# Patient Record
Sex: MALE | Race: BLACK OR AFRICAN AMERICAN | NOT HISPANIC OR LATINO | Employment: FULL TIME | URBAN - METROPOLITAN AREA
[De-identification: names, ages, dates, MRNs, and addresses within clinical notes are randomized per-mention and may not be internally consistent; named-entity substitution may affect disease eponyms.]

---

## 2023-08-10 ENCOUNTER — HOSPITAL ENCOUNTER (EMERGENCY)
Facility: HOSPITAL | Age: 53
Discharge: HOME | End: 2023-08-10
Attending: EMERGENCY MEDICINE

## 2023-08-10 VITALS
OXYGEN SATURATION: 99 % | WEIGHT: 165 LBS | SYSTOLIC BLOOD PRESSURE: 147 MMHG | HEIGHT: 67 IN | TEMPERATURE: 99 F | DIASTOLIC BLOOD PRESSURE: 90 MMHG | RESPIRATION RATE: 18 BRPM | BODY MASS INDEX: 25.9 KG/M2 | HEART RATE: 88 BPM

## 2023-08-10 DIAGNOSIS — M75.52 ACUTE BURSITIS OF LEFT SHOULDER: Primary | ICD-10-CM

## 2023-08-10 PROCEDURE — 96372 THER/PROPH/DIAG INJ SC/IM: CPT | Performed by: EMERGENCY MEDICINE

## 2023-08-10 PROCEDURE — 63600175 PHARM REV CODE 636 W HCPCS: Mod: ER | Performed by: EMERGENCY MEDICINE

## 2023-08-10 PROCEDURE — 99284 EMERGENCY DEPT VISIT MOD MDM: CPT | Mod: ER

## 2023-08-10 RX ORDER — MELOXICAM 7.5 MG/1
7.5 TABLET ORAL DAILY
Qty: 7 TABLET | Refills: 0 | Status: SHIPPED | OUTPATIENT
Start: 2023-08-10 | End: 2023-08-17

## 2023-08-10 RX ORDER — KETOROLAC TROMETHAMINE 30 MG/ML
10 INJECTION, SOLUTION INTRAMUSCULAR; INTRAVENOUS
Status: COMPLETED | OUTPATIENT
Start: 2023-08-10 | End: 2023-08-10

## 2023-08-10 RX ORDER — CYCLOBENZAPRINE HCL 10 MG
10 TABLET ORAL 3 TIMES DAILY PRN
Qty: 15 TABLET | Refills: 0 | Status: SHIPPED | OUTPATIENT
Start: 2023-08-10 | End: 2023-08-15

## 2023-08-10 RX ADMIN — KETOROLAC TROMETHAMINE 10 MG: 30 INJECTION, SOLUTION INTRAMUSCULAR; INTRAVENOUS at 03:08

## 2023-08-11 NOTE — ED PROVIDER NOTES
"ED Provider Note - 8/10/2023    History     Chief Complaint   Patient presents with    Arm Pain     Pt c/o left arm pain starting yesterday am after waking up from sleep. Pt denies injury, trauma or fall. Pt self drove to ED. Denies taking medication for pain pta. Pain 10/10.      Patient currently presents with a chief complaint of pain to the left shoulder.  This was acquired yesterday.  Patient notes associated symptoms of pain.  Denies associated bruising, numbness, deformity, abrasion, laceration, and loss of ROM      Review of patient's allergies indicates:   Allergen Reactions    Tramadol Hives    Penicillins Rash     No past medical history on file.  No past surgical history on file.  No family history on file.  Social History     Tobacco Use    Smoking status: Every Day     Current packs/day: 0.50     Types: Cigarettes   Substance Use Topics    Alcohol use: Yes     Comment: 2-3 beers a day     Drug use: No     Review of Systems   Constitutional:  Negative for chills and fever.   HENT:  Negative for congestion and sore throat.    Respiratory:  Negative for chest tightness and shortness of breath.    Cardiovascular:  Negative for chest pain and palpitations.   Gastrointestinal:  Negative for abdominal pain and vomiting.   Genitourinary:  Negative for difficulty urinating and dysuria.   Skin:  Negative for color change and rash.   Neurological:  Negative for weakness and numbness.   All other systems reviewed and are negative.      Physical Exam     Initial Vitals [08/10/23 0242]   BP Pulse Resp Temp SpO2   (!) 155/91 100 18 99 °F (37.2 °C) 99 %      MAP       --         Vitals:    08/10/23 0242 08/10/23 0413   BP: (!) 155/91 (!) 147/90   Pulse: 100 88   Resp: 18 18   Temp: 99 °F (37.2 °C) 98.9 °F (37.2 °C)   TempSrc: Oral Oral   SpO2: 99%    Weight: 74.8 kg (165 lb)    Height: 5' 7" (1.702 m)      Physical Exam    Nursing note and vitals reviewed.  Constitutional: He appears well-developed and well-nourished. " He is not diaphoretic. No distress.   HENT:   Head: Normocephalic and atraumatic.   Nose: Nose normal.   Mouth/Throat: Oropharynx is clear and moist.   Eyes: Conjunctivae are normal. No scleral icterus.   Cardiovascular:  Normal rate, regular rhythm, normal heart sounds and intact distal pulses.           Pulmonary/Chest: Breath sounds normal. No respiratory distress.   Musculoskeletal:         General: No edema. Normal range of motion.      Left shoulder: Tenderness present. No deformity or crepitus. Normal range of motion. Normal strength. Normal pulse.        Arms:      Neurological: He is alert and oriented to person, place, and time. He has normal strength.   Skin: Skin is warm and dry.         ED Course   Procedures                   MDM  Differential Diagnoses   Based on available history, the working differential diagnoses considered during this evaluation include but are not limited to  bursitis, arthritis, strain/sprain .      LABS   Labs Reviewed - No data to display      Imaging     Imaging Results    None            EKG        ED Management/Discussion     Medications   ketorolac injection 9.999 mg (9.999 mg Intramuscular Given 8/10/23 0352)                   The patient's list of active medical problems, social history, medications, and allergies as documented per RN staff has been reviewed.                 On final assessment, the patient appears well and comfortable for discharge.  I see no indication of an emergent process beyond that addressed during our encounter but have duly counseled the patient/family regarding the need for prompt follow-up as well as the indications that should prompt immediate return to the emergency room should new or worrisome developments occur.  The patient/family has been provided with verbal and printed direction regarding our final diagnosis(es) as well as instructions regarding use of OTC and/or Rx medications intended to manage the patient's aforementioned conditions  including:  ED Prescriptions       Medication Sig Dispense Start Date End Date Auth. Provider    meloxicam (MOBIC) 7.5 MG tablet Take 1 tablet (7.5 mg total) by mouth once daily. for 7 days 7 tablet 8/10/2023 8/17/2023 Tone Gomez MD    cyclobenzaprine (FLEXERIL) 10 MG tablet Take 1 tablet (10 mg total) by mouth 3 (three) times daily as needed for Muscle spasms. 15 tablet 8/10/2023 8/15/2023 Tone Gomez MD              Patient has been advised of the following recommended follow-up instructions:  Follow-up Information       Follow up With Specialties Details Why Contact Info    PCP  Schedule an appointment as soon as possible for a visit  for reassessment     [facility] Emergency Dept Emergency Medicine Go to  As needed, If symptoms worsen [address] [facility] [address] [phone]          The patient/family communicates understanding of all this information and all remaining questions and concerns were addressed at this time.      Referrals:  No orders of the defined types were placed in this encounter.      CLINICAL IMPRESSION    ICD-10-CM ICD-9-CM   1. Acute bursitis of left shoulder  M75.52 726.10          ED Disposition Condition    Discharge Stable               Tone Gomez MD  08/11/23 9934

## 2023-08-13 ENCOUNTER — HOSPITAL ENCOUNTER (EMERGENCY)
Facility: HOSPITAL | Age: 53
Discharge: HOME | End: 2023-08-13
Attending: EMERGENCY MEDICINE

## 2023-08-13 VITALS
WEIGHT: 170 LBS | DIASTOLIC BLOOD PRESSURE: 98 MMHG | HEIGHT: 67 IN | TEMPERATURE: 98 F | HEART RATE: 97 BPM | RESPIRATION RATE: 16 BRPM | SYSTOLIC BLOOD PRESSURE: 164 MMHG | BODY MASS INDEX: 26.68 KG/M2 | OXYGEN SATURATION: 100 %

## 2023-08-13 DIAGNOSIS — M75.52 BURSITIS OF LEFT SHOULDER: Primary | ICD-10-CM

## 2023-08-13 PROCEDURE — 99284 EMERGENCY DEPT VISIT MOD MDM: CPT | Mod: ER

## 2023-08-13 PROCEDURE — 63600175 PHARM REV CODE 636 W HCPCS: Mod: ER | Performed by: EMERGENCY MEDICINE

## 2023-08-13 PROCEDURE — 96372 THER/PROPH/DIAG INJ SC/IM: CPT | Performed by: EMERGENCY MEDICINE

## 2023-08-13 RX ORDER — METHYLPREDNISOLONE 4 MG/1
TABLET ORAL
Qty: 1 EACH | Refills: 0 | Status: SHIPPED | OUTPATIENT
Start: 2023-08-13 | End: 2023-09-03

## 2023-08-13 RX ORDER — KETOROLAC TROMETHAMINE 30 MG/ML
10 INJECTION, SOLUTION INTRAMUSCULAR; INTRAVENOUS
Status: COMPLETED | OUTPATIENT
Start: 2023-08-13 | End: 2023-08-13

## 2023-08-13 RX ADMIN — KETOROLAC TROMETHAMINE 10 MG: 30 INJECTION INTRAMUSCULAR; INTRAVENOUS at 04:08

## 2023-08-13 NOTE — ED PROVIDER NOTES
ED Provider Note - 8/13/2023    History     Chief Complaint   Patient presents with    Shoulder Pain     Pt c/o left shoulder pain as last visit. Denies new injury, trauma or fall. Reports pain 10/10 with last dose of flexiril and mobic at 10pm last night. Pt self drove to ED.      Patient currently returns with concern regarding pain to the left shoulder.  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  Patient was seen here recently for similar complaints with diagnosis of bursitis.  Patient notes that he did not take any of the prescribed medication yesterday out of concern for drowsiness.  He does note that he took the medications around 10:00 p.m. this past evening.      Review of patient's allergies indicates:   Allergen Reactions    Tramadol Hives    Penicillins Rash     History reviewed. No pertinent past medical history.  No past surgical history on file.  No family history on file.  Social History     Tobacco Use    Smoking status: Every Day     Current packs/day: 0.50     Types: Cigarettes   Substance Use Topics    Alcohol use: Yes     Comment: 2-3 beers a day     Drug use: No     Review of Systems   Constitutional:  Negative for chills and fever.   HENT:  Negative for congestion and sore throat.    Respiratory:  Negative for chest tightness and shortness of breath.    Cardiovascular:  Negative for chest pain and palpitations.   Gastrointestinal:  Negative for abdominal pain and vomiting.   Genitourinary:  Negative for difficulty urinating and dysuria.   Skin:  Negative for color change and rash.   Neurological:  Negative for weakness and numbness.   All other systems reviewed and are negative.      Physical Exam     Initial Vitals [08/13/23 0440]   BP Pulse Resp Temp SpO2   (!) 164/98 97 16 98.4 °F (36.9 °C) 100 %      MAP       --         Physical Exam    Nursing note reviewed.  Constitutional: He appears well-developed and well-nourished. He is not diaphoretic. No  distress.   HENT:   Head: Normocephalic and atraumatic.   Nose: Nose normal.   Mouth/Throat: Oropharynx is clear and moist.   Eyes: Conjunctivae are normal. No scleral icterus.   Cardiovascular:  Normal rate, regular rhythm, normal heart sounds and intact distal pulses.           Pulmonary/Chest: Breath sounds normal. No respiratory distress.   Abdominal: Abdomen is soft. There is no abdominal tenderness.   Musculoskeletal:         General: No edema. Normal range of motion.      Left shoulder: Tenderness present. No swelling or deformity. Normal range of motion. Normal strength. Normal pulse.        Arms:      Neurological: He is alert and oriented to person, place, and time. He has normal strength.   Skin: Skin is warm and dry.         ED Course   Procedures                   MDM  Differential Diagnoses   Based on available history, the working differential diagnoses considered during this evaluation include but are not limited to sprain/strain, contusion, fracture, dislocation.      LABS   Labs Reviewed - No data to display      Imaging     Imaging Results    None            EKG        ED Management/Discussion     Medications   ketorolac injection 9.999 mg (9.999 mg Intramuscular Given 8/13/23 0448)                   The patient's list of active medical problems, social history, medications, and allergies as documented per RN staff has been reviewed.                 Symptoms appear to be unchanged from prior evaluation.  Findings continue to be consistent with that bursitis.  We have added a Medrol Dosepak but have encouraged him to continue use of the muscle relaxant and anti-inflammatory agent.    I see no indication of an emergent process beyond that addressed during our encounter but have duly counseled the patient/family regarding the need for prompt follow-up as well as the indications that should prompt immediate return to the emergency room should new or worrisome developments occur.  The patient/family  has been provided with verbal and printed direction regarding our final diagnosis(es) as well as instructions regarding use of OTC and/or Rx medications intended to manage the patient's aforementioned conditions including:  ED Prescriptions       Medication Sig Dispense Start Date End Date Auth. Provider    methylPREDNISolone (MEDROL DOSEPACK) 4 mg tablet 1 pack as directed 1 each 8/13/2023 9/3/2023 Tone Gomez MD              Patient has been advised of the following recommended follow-up instructions:  Follow-up Information       Follow up With Specialties Details Why Contact Info    PCP  Schedule an appointment as soon as possible for a visit  for reassessment     [facility] Emergency Dept Emergency Medicine Go to  As needed, If symptoms worsen [address] [facility] [address] [phone]          The patient/family communicates understanding of all this information and all remaining questions and concerns were addressed at this time.      Referrals:  No orders of the defined types were placed in this encounter.      CLINICAL IMPRESSION    ICD-10-CM ICD-9-CM   1. Bursitis of left shoulder  M75.52 726.10          ED Disposition Condition    Discharge Stable               Tone Gomez MD  08/13/23 5510